# Patient Record
Sex: FEMALE | Race: BLACK OR AFRICAN AMERICAN | NOT HISPANIC OR LATINO | Employment: FULL TIME | RURAL
[De-identification: names, ages, dates, MRNs, and addresses within clinical notes are randomized per-mention and may not be internally consistent; named-entity substitution may affect disease eponyms.]

---

## 2015-10-21 LAB — PAP RECOMMENDATION EXT: NORMAL

## 2018-11-09 LAB — BCS RECOMMENDATION EXT: NORMAL

## 2021-11-24 ENCOUNTER — OFFICE VISIT (OUTPATIENT)
Dept: FAMILY MEDICINE | Facility: CLINIC | Age: 51
End: 2021-11-24
Payer: COMMERCIAL

## 2021-11-24 VITALS
HEART RATE: 69 BPM | SYSTOLIC BLOOD PRESSURE: 143 MMHG | WEIGHT: 265 LBS | HEIGHT: 71 IN | OXYGEN SATURATION: 98 % | RESPIRATION RATE: 18 BRPM | TEMPERATURE: 99 F | BODY MASS INDEX: 37.1 KG/M2 | DIASTOLIC BLOOD PRESSURE: 91 MMHG

## 2021-11-24 DIAGNOSIS — F41.9 ANXIETY: Chronic | ICD-10-CM

## 2021-11-24 DIAGNOSIS — R42 DIZZINESS: ICD-10-CM

## 2021-11-24 DIAGNOSIS — F32.0 CURRENT MILD EPISODE OF MAJOR DEPRESSIVE DISORDER, UNSPECIFIED WHETHER RECURRENT: Primary | Chronic | ICD-10-CM

## 2021-11-24 LAB
ANION GAP SERPL CALCULATED.3IONS-SCNC: 11 MMOL/L (ref 7–16)
BASOPHILS # BLD AUTO: 0.04 K/UL (ref 0–0.2)
BASOPHILS NFR BLD AUTO: 0.8 % (ref 0–1)
BUN SERPL-MCNC: 9 MG/DL (ref 7–18)
BUN/CREAT SERPL: 15 (ref 6–20)
CALCIUM SERPL-MCNC: 8.7 MG/DL (ref 8.5–10.1)
CHLORIDE SERPL-SCNC: 107 MMOL/L (ref 98–107)
CO2 SERPL-SCNC: 25 MMOL/L (ref 21–32)
CREAT SERPL-MCNC: 0.61 MG/DL (ref 0.55–1.02)
DIFFERENTIAL METHOD BLD: ABNORMAL
EOSINOPHIL # BLD AUTO: 0.18 K/UL (ref 0–0.5)
EOSINOPHIL NFR BLD AUTO: 3.8 % (ref 1–4)
ERYTHROCYTE [DISTWIDTH] IN BLOOD BY AUTOMATED COUNT: 12.3 % (ref 11.5–14.5)
GLUCOSE SERPL-MCNC: 79 MG/DL (ref 74–106)
HCT VFR BLD AUTO: 42.8 % (ref 38–47)
HGB BLD-MCNC: 14.2 G/DL (ref 12–16)
IMM GRANULOCYTES # BLD AUTO: 0 K/UL (ref 0–0.04)
IMM GRANULOCYTES NFR BLD: 0 % (ref 0–0.4)
LYMPHOCYTES # BLD AUTO: 1.61 K/UL (ref 1–4.8)
LYMPHOCYTES NFR BLD AUTO: 34 % (ref 27–41)
MCH RBC QN AUTO: 30.3 PG (ref 27–31)
MCHC RBC AUTO-ENTMCNC: 33.2 G/DL (ref 32–36)
MCV RBC AUTO: 91.5 FL (ref 80–96)
MONOCYTES # BLD AUTO: 0.6 K/UL (ref 0–0.8)
MONOCYTES NFR BLD AUTO: 12.7 % (ref 2–6)
MPC BLD CALC-MCNC: 10.5 FL (ref 9.4–12.4)
NEUTROPHILS # BLD AUTO: 2.31 K/UL (ref 1.8–7.7)
NEUTROPHILS NFR BLD AUTO: 48.7 % (ref 53–65)
NRBC # BLD AUTO: 0 X10E3/UL
NRBC, AUTO (.00): 0 %
PLATELET # BLD AUTO: 272 K/UL (ref 150–400)
POTASSIUM SERPL-SCNC: 3.8 MMOL/L (ref 3.5–5.1)
RBC # BLD AUTO: 4.68 M/UL (ref 4.2–5.4)
SODIUM SERPL-SCNC: 139 MMOL/L (ref 136–145)
TSH SERPL DL<=0.005 MIU/L-ACNC: 3.15 UIU/ML (ref 0.36–3.74)
WBC # BLD AUTO: 4.74 K/UL (ref 4.5–11)

## 2021-11-24 PROCEDURE — 85025 COMPLETE CBC W/AUTO DIFF WBC: CPT | Mod: ,,, | Performed by: CLINICAL MEDICAL LABORATORY

## 2021-11-24 PROCEDURE — 99214 OFFICE O/P EST MOD 30 MIN: CPT | Mod: ,,, | Performed by: NURSE PRACTITIONER

## 2021-11-24 PROCEDURE — 80048 BASIC METABOLIC PNL TOTAL CA: CPT | Mod: ,,, | Performed by: CLINICAL MEDICAL LABORATORY

## 2021-11-24 PROCEDURE — 84443 ASSAY THYROID STIM HORMONE: CPT | Mod: ,,, | Performed by: CLINICAL MEDICAL LABORATORY

## 2021-11-24 PROCEDURE — 85025 CBC WITH DIFFERENTIAL: ICD-10-PCS | Mod: ,,, | Performed by: CLINICAL MEDICAL LABORATORY

## 2021-11-24 PROCEDURE — 84443 TSH: ICD-10-PCS | Mod: ,,, | Performed by: CLINICAL MEDICAL LABORATORY

## 2021-11-24 PROCEDURE — 99214 PR OFFICE/OUTPT VISIT, EST, LEVL IV, 30-39 MIN: ICD-10-PCS | Mod: ,,, | Performed by: NURSE PRACTITIONER

## 2021-11-24 PROCEDURE — 80048 BASIC METABOLIC PANEL: ICD-10-PCS | Mod: ,,, | Performed by: CLINICAL MEDICAL LABORATORY

## 2021-11-24 RX ORDER — FLUOXETINE HYDROCHLORIDE 20 MG/1
20 CAPSULE ORAL DAILY
Qty: 30 CAPSULE | Refills: 1 | Status: SHIPPED | OUTPATIENT
Start: 2021-11-24 | End: 2023-11-27

## 2021-11-29 ENCOUNTER — TELEPHONE (OUTPATIENT)
Dept: FAMILY MEDICINE | Facility: CLINIC | Age: 51
End: 2021-11-29
Payer: COMMERCIAL

## 2022-12-28 ENCOUNTER — OFFICE VISIT (OUTPATIENT)
Dept: FAMILY MEDICINE | Facility: CLINIC | Age: 52
End: 2022-12-28
Payer: COMMERCIAL

## 2022-12-28 VITALS
SYSTOLIC BLOOD PRESSURE: 131 MMHG | RESPIRATION RATE: 20 BRPM | HEIGHT: 71 IN | TEMPERATURE: 98 F | WEIGHT: 271.63 LBS | DIASTOLIC BLOOD PRESSURE: 94 MMHG | OXYGEN SATURATION: 97 % | BODY MASS INDEX: 38.03 KG/M2 | HEART RATE: 72 BPM

## 2022-12-28 DIAGNOSIS — M25.562 ARTHRALGIA OF BOTH KNEES: Primary | ICD-10-CM

## 2022-12-28 DIAGNOSIS — M25.561 ARTHRALGIA OF BOTH KNEES: Primary | ICD-10-CM

## 2022-12-28 PROCEDURE — 3075F SYST BP GE 130 - 139MM HG: CPT | Mod: CPTII,,, | Performed by: NURSE PRACTITIONER

## 2022-12-28 PROCEDURE — 99213 PR OFFICE/OUTPT VISIT, EST, LEVL III, 20-29 MIN: ICD-10-PCS | Mod: ,,, | Performed by: NURSE PRACTITIONER

## 2022-12-28 PROCEDURE — 1159F MED LIST DOCD IN RCRD: CPT | Mod: CPTII,,, | Performed by: NURSE PRACTITIONER

## 2022-12-28 PROCEDURE — 1159F PR MEDICATION LIST DOCUMENTED IN MEDICAL RECORD: ICD-10-PCS | Mod: CPTII,,, | Performed by: NURSE PRACTITIONER

## 2022-12-28 PROCEDURE — 3008F BODY MASS INDEX DOCD: CPT | Mod: CPTII,,, | Performed by: NURSE PRACTITIONER

## 2022-12-28 PROCEDURE — 99213 OFFICE O/P EST LOW 20 MIN: CPT | Mod: ,,, | Performed by: NURSE PRACTITIONER

## 2022-12-28 PROCEDURE — 3075F PR MOST RECENT SYSTOLIC BLOOD PRESS GE 130-139MM HG: ICD-10-PCS | Mod: CPTII,,, | Performed by: NURSE PRACTITIONER

## 2022-12-28 PROCEDURE — 3080F DIAST BP >= 90 MM HG: CPT | Mod: CPTII,,, | Performed by: NURSE PRACTITIONER

## 2022-12-28 PROCEDURE — 3080F PR MOST RECENT DIASTOLIC BLOOD PRESSURE >= 90 MM HG: ICD-10-PCS | Mod: CPTII,,, | Performed by: NURSE PRACTITIONER

## 2022-12-28 PROCEDURE — 3008F PR BODY MASS INDEX (BMI) DOCUMENTED: ICD-10-PCS | Mod: CPTII,,, | Performed by: NURSE PRACTITIONER

## 2022-12-28 RX ORDER — NAPROXEN 500 MG/1
500 TABLET ORAL 2 TIMES DAILY WITH MEALS
Qty: 60 TABLET | Refills: 1 | Status: SHIPPED | OUTPATIENT
Start: 2022-12-28 | End: 2023-11-27

## 2022-12-28 NOTE — PROGRESS NOTES
JAYCEE Andrew   [facility]  [address]   [place] [address]     PATIENT NAME: Beverley Rdz  : 1970  DATE: 22  MRN: [id number]      Billing Provider: JAYCEE Andrew  Level of Service:   Patient PCP Information       Provider PCP Type    JAYCEE Andrew General            Reason for Visit / Chief Complaint: Arthritis (Left arm/hand, right elbow, back, bilat knees. ) and Eye Drainage (Bilat eyes irritated and watering x about 4 days.)       Update PCP  Update Chief Complaint         History of Present Illness / Problem Focused Workflow     Beverley Rdz is a 52 y.o. female presents to the clinic  with complaint of mutliple joint pain (left shoulder, bilateral  elbows, hands, bilateral knees ) and has  been taking aleve one time daily  with parrtial relief and has used biofreeze with minimal relief.  She has been on vacation  x 2 weeks and states she has not been doing anything at home to injure herself.    Eyes feel irritated and  itchy and  very dry.  She has tried  some  Visine.  She sleeps  with a fan on in the room.  NO allergy symptoms.       Review of Systems     Review of Systems   Constitutional:  Negative for fatigue.   HENT:  Negative for nasal congestion and sore throat.    Respiratory:  Negative for cough, chest tightness and shortness of breath.    Cardiovascular:  Negative for chest pain, palpitations and leg swelling.   Gastrointestinal:  Negative for nausea, vomiting and reflux.   Musculoskeletal:  Positive for arthralgias.   Neurological:  Negative for weakness and memory loss.   Psychiatric/Behavioral:  Negative for confusion and sleep disturbance.       Medical / Social / Family History     Past Medical History:   Diagnosis Date    Anxiety     Depression        Past Surgical History:   Procedure Laterality Date     SECTION      HERNIA REPAIR      PARTIAL HYSTERECTOMY         Social History  Ms.  reports that she has never smoked. She has never used  "smokeless tobacco. She reports that she does not drink alcohol and does not use drugs.    Family History  Ms.'s family history includes Hypertension in her mother.    Medications and Allergies     Medications  No outpatient medications have been marked as taking for the 12/28/22 encounter (Office Visit) with JAYCEE Andrew.       Allergies  Review of patient's allergies indicates:  No Known Allergies    Physical Examination     Vitals:    12/28/22 1054   BP: (!) 131/94   Pulse: 72   Resp: 20   Temp: 98.4 °F (36.9 °C)   TempSrc: Oral   SpO2: 97%   Weight: 123.2 kg (271 lb 9.6 oz)   Height: 5' 11" (1.803 m)      Physical Exam  Constitutional:       Appearance: Normal appearance.   Cardiovascular:      Rate and Rhythm: Normal rate and regular rhythm.      Pulses: Normal pulses.      Heart sounds: Normal heart sounds.   Pulmonary:      Effort: Pulmonary effort is normal.      Breath sounds: Normal breath sounds.   Musculoskeletal:      Comments: Shoulders and scapula are not tender to palp.  She is tender to palp right lateral/medial epicondyles and is tender to palp latearal aspects of both knees with crepitus noted.   Skin:     General: Skin is warm and dry.   Neurological:      Mental Status: She is alert.        Assessment and Plan (including Health Maintenance)      Problem List  Smart Sets  Document Outside HM   :    Plan:  recommended she use  ice packs to elbow joints that are tender, use of Osteobioflex on a regular basis for joint protection, stretching exercises every day.  Arthritis Tylenol can be used per package directions for pain relief.   Will Rx Naproxen 500mg to take one tab bid prn for joint pain--cautioned that may cause kidney damage, GI  upset--take with food.  Recommended Systane eye drops for dry eye, avoid sleeping with fan on.         Health Maintenance Due   Topic Date Due    Hepatitis C Screening  Never done    Cervical Cancer Screening  Never done    Lipid Panel  Never done    HIV " Screening  Never done    TETANUS VACCINE  Never done    Mammogram  Never done    Colorectal Cancer Screening  Never done    Shingles Vaccine (1 of 2) Never done    COVID-19 Vaccine (3 - Booster for Moderna series) 06/13/2021    Influenza Vaccine (1) Never done       Problem List Items Addressed This Visit    None    There are no diagnoses linked to this encounter.   The patient has no Health Maintenance topics of status Not Due    Procedures     No future appointments.     No follow-ups on file.     Signature:  JAYCEE Andrew    Date of encounter: 12/28/22

## 2023-02-06 ENCOUNTER — PATIENT OUTREACH (OUTPATIENT)
Dept: ADMINISTRATIVE | Facility: HOSPITAL | Age: 53
End: 2023-02-06

## 2023-11-27 ENCOUNTER — OFFICE VISIT (OUTPATIENT)
Dept: FAMILY MEDICINE | Facility: CLINIC | Age: 53
End: 2023-11-27
Payer: COMMERCIAL

## 2023-11-27 VITALS
BODY MASS INDEX: 36.63 KG/M2 | WEIGHT: 261.63 LBS | RESPIRATION RATE: 20 BRPM | HEIGHT: 71 IN | TEMPERATURE: 98 F | SYSTOLIC BLOOD PRESSURE: 118 MMHG | DIASTOLIC BLOOD PRESSURE: 83 MMHG | HEART RATE: 79 BPM | OXYGEN SATURATION: 98 %

## 2023-11-27 DIAGNOSIS — J10.1 INFLUENZA A: Primary | ICD-10-CM

## 2023-11-27 DIAGNOSIS — R05.9 COUGH, UNSPECIFIED TYPE: ICD-10-CM

## 2023-11-27 LAB
CTP QC/QA: YES
CTP QC/QA: YES
FLUAV AG NPH QL: POSITIVE
FLUBV AG NPH QL: NEGATIVE
SARS-COV-2 RDRP RESP QL NAA+PROBE: NEGATIVE

## 2023-11-27 PROCEDURE — 3008F PR BODY MASS INDEX (BMI) DOCUMENTED: ICD-10-PCS | Mod: CPTII,,, | Performed by: NURSE PRACTITIONER

## 2023-11-27 PROCEDURE — 99213 OFFICE O/P EST LOW 20 MIN: CPT | Mod: ,,, | Performed by: NURSE PRACTITIONER

## 2023-11-27 PROCEDURE — 87804 INFLUENZA ASSAY W/OPTIC: CPT | Mod: 59,QW,, | Performed by: NURSE PRACTITIONER

## 2023-11-27 PROCEDURE — 99213 PR OFFICE/OUTPT VISIT, EST, LEVL III, 20-29 MIN: ICD-10-PCS | Mod: ,,, | Performed by: NURSE PRACTITIONER

## 2023-11-27 PROCEDURE — 3079F PR MOST RECENT DIASTOLIC BLOOD PRESSURE 80-89 MM HG: ICD-10-PCS | Mod: CPTII,,, | Performed by: NURSE PRACTITIONER

## 2023-11-27 PROCEDURE — 87804 POCT INFLUENZA A/B: ICD-10-PCS | Mod: 59,QW,, | Performed by: NURSE PRACTITIONER

## 2023-11-27 PROCEDURE — 3008F BODY MASS INDEX DOCD: CPT | Mod: CPTII,,, | Performed by: NURSE PRACTITIONER

## 2023-11-27 PROCEDURE — 3074F SYST BP LT 130 MM HG: CPT | Mod: CPTII,,, | Performed by: NURSE PRACTITIONER

## 2023-11-27 PROCEDURE — 87635 SARS-COV-2 COVID-19 AMP PRB: CPT | Mod: QW,,, | Performed by: NURSE PRACTITIONER

## 2023-11-27 PROCEDURE — 87635: ICD-10-PCS | Mod: QW,,, | Performed by: NURSE PRACTITIONER

## 2023-11-27 PROCEDURE — 3079F DIAST BP 80-89 MM HG: CPT | Mod: CPTII,,, | Performed by: NURSE PRACTITIONER

## 2023-11-27 PROCEDURE — 3074F PR MOST RECENT SYSTOLIC BLOOD PRESSURE < 130 MM HG: ICD-10-PCS | Mod: CPTII,,, | Performed by: NURSE PRACTITIONER

## 2023-11-27 PROCEDURE — 1159F PR MEDICATION LIST DOCUMENTED IN MEDICAL RECORD: ICD-10-PCS | Mod: CPTII,,, | Performed by: NURSE PRACTITIONER

## 2023-11-27 PROCEDURE — 1159F MED LIST DOCD IN RCRD: CPT | Mod: CPTII,,, | Performed by: NURSE PRACTITIONER

## 2023-11-27 NOTE — LETTER
November 27, 2023      [facility] [address] [place] [address]  Phone: [phone]  Fax: [phone]       Patient: Beverley Rdz   YOB: 1970  Date of Visit: 11/27/2023    To Whom It May Concern:    JENIFER Rdz  was at  on 11/27/2023 with influenza.   The patient may return to work/school on 11/28/23  if no fever restrictions. If you have any questions or concerns, or if I can be of further assistance, please do not hesitate to contact me.    Sincerely,    JAYCEE Andrew

## 2023-11-27 NOTE — PROGRESS NOTES
JAYCEE Andrew   [facility]  [address]   [place] [address]     PATIENT NAME: Beverley Rdz  : 1970  DATE: 23  MRN: [id number]      Billing Provider: JAYCEE Andrew  Level of Service:   Patient PCP Information       Provider PCP Type    JAYCEE Andrew General            Reason for Visit / Chief Complaint: Cough (X 4 days (23).), Nasal Congestion, Sinus Problem (Pressure behind eyes), Fatigue, Anorexia (Loss of appetite. ), Fever (Thinks she has had fever off and on since Thursday (23).), and Generalized Body Aches         History of Present Illness / Problem Focused Workflow     Beverley Rdz is a 53 y.o. female presents to the clinic  with approx 4 day history of sinus pressure, nasal congestion, cough, fatigue and fever and body aches. She has been  taking OTC meds and has improved.      Review of Systems     Review of Systems   Constitutional:  Positive for chills, fatigue and fever.   HENT:  Positive for nasal congestion, sinus pressure/congestion and sore throat.    Respiratory:  Positive for cough. Negative for choking, chest tightness and shortness of breath.    Cardiovascular:  Negative for chest pain, palpitations and leg swelling.   Gastrointestinal:  Negative for nausea, vomiting and reflux.   Neurological:  Negative for weakness and memory loss.   Psychiatric/Behavioral:  Negative for confusion and sleep disturbance.         Medical / Social / Family History     Past Medical History:   Diagnosis Date    Anxiety     Depression        Past Surgical History:   Procedure Laterality Date     SECTION      HERNIA REPAIR      PARTIAL HYSTERECTOMY         Social History  Ms.  reports that she has never smoked. She has been exposed to tobacco smoke. She has never used smokeless tobacco. She reports that she does not drink alcohol and does not use drugs.    Family History  Ms.'s family history includes Hypertension in her mother.    Medications and  "Allergies     Medications  No outpatient medications have been marked as taking for the 11/27/23 encounter (Office Visit) with Yandy Amaro FNP.       Allergies  Review of patient's allergies indicates:  No Known Allergies    Physical Examination     Vitals:    11/27/23 1026   BP: 118/83   Pulse: 79   Resp: 20   Temp: 98.3 °F (36.8 °C)   TempSrc: Oral   SpO2: 98%   Weight: 118.7 kg (261 lb 9.6 oz)   Height: 5' 11" (1.803 m)      Physical Exam  Constitutional:       Appearance: Normal appearance.   HENT:      Right Ear: Tympanic membrane, ear canal and external ear normal.      Left Ear: Tympanic membrane, ear canal and external ear normal.      Nose: Rhinorrhea present.      Mouth/Throat:      Pharynx: No oropharyngeal exudate or posterior oropharyngeal erythema.   Cardiovascular:      Rate and Rhythm: Normal rate and regular rhythm.      Pulses: Normal pulses.      Heart sounds: Normal heart sounds.   Pulmonary:      Effort: Pulmonary effort is normal.      Breath sounds: Normal breath sounds.   Lymphadenopathy:      Cervical: No cervical adenopathy.   Skin:     General: Skin is warm and dry.   Neurological:      Mental Status: She is alert.   Psychiatric:         Behavior: Behavior normal.          Assessment and Plan (including Health Maintenance)     :    Plan:  pt advised she has  Type A flu and should continue with OTC cough and congestion meds  and drink lots of fluids.  Follow up if needed. Work excuse provided to return tomorrow if fever free. /tm        Health Maintenance Due   Topic Date Due    Hepatitis C Screening  Never done    Lipid Panel  Never done    HIV Screening  Never done    TETANUS VACCINE  Never done    Hemoglobin A1c (Diabetic Prevention Screening)  Never done    Colorectal Cancer Screening  Never done    Mammogram  11/09/2019    Shingles Vaccine (1 of 2) Never done    Influenza Vaccine (1) Never done    COVID-19 Vaccine (3 - 2023-24 season) 09/01/2023       Problem List Items Addressed This " Visit          ID    Influenza A - Primary     Other Visit Diagnoses       Cough, unspecified type            .  Influenza A    Cough, unspecified type  -     POCT Influenza A/B  -     POCT COVID-19 Rapid Screening       The patient has no Health Maintenance topics of status Not Due    Procedures     No future appointments.     No follow-ups on file.     Signature:  JAYCEE Andrew    Date of encounter: 11/27/23

## 2023-12-04 ENCOUNTER — TELEPHONE (OUTPATIENT)
Dept: FAMILY MEDICINE | Facility: CLINIC | Age: 53
End: 2023-12-04
Payer: COMMERCIAL

## 2023-12-04 DIAGNOSIS — Z12.31 ENCOUNTER FOR SCREENING MAMMOGRAM FOR MALIGNANT NEOPLASM OF BREAST: Primary | ICD-10-CM

## 2023-12-04 NOTE — TELEPHONE ENCOUNTER
Spoke with pt. She would like her mammogram scheduled at [facility]. She will be on vacation 12/18 - 1/2 and would like it scheduled sometime during that time.

## 2024-02-27 ENCOUNTER — TELEPHONE (OUTPATIENT)
Dept: FAMILY MEDICINE | Facility: CLINIC | Age: 54
End: 2024-02-27
Payer: COMMERCIAL

## 2024-02-27 RX ORDER — DICLOFENAC SODIUM 75 MG/1
75 TABLET, DELAYED RELEASE ORAL 2 TIMES DAILY
COMMUNITY
Start: 2023-12-18

## 2024-02-27 NOTE — TELEPHONE ENCOUNTER
Randi VASQUEZ let us know that this pt's mammogram was abnormal and they have already contacted the patient and scheduled her for more imaging.

## 2024-03-07 ENCOUNTER — TELEPHONE (OUTPATIENT)
Dept: FAMILY MEDICINE | Facility: CLINIC | Age: 54
End: 2024-03-07
Payer: COMMERCIAL

## 2024-03-07 ENCOUNTER — PATIENT OUTREACH (OUTPATIENT)
Dept: ADMINISTRATIVE | Facility: HOSPITAL | Age: 54
End: 2024-03-07

## 2024-03-07 NOTE — TELEPHONE ENCOUNTER
Contacted pt with recent mammogram and breast ultrasound results from [facility].discussed with pt the need for 6 month ultrasounds and pt vo understanding. Pt has an appt with JAYCEE Andrew, 03/11/2024 and can schedule her 6 month breast sono.

## 2024-03-07 NOTE — PROGRESS NOTES
Uploaded mammogram from [facility] [place]. It was incomplete, and additional imaging evaluation is needed.

## 2024-03-08 ENCOUNTER — PATIENT OUTREACH (OUTPATIENT)
Dept: ADMINISTRATIVE | Facility: HOSPITAL | Age: 54
End: 2024-03-08

## 2024-03-08 NOTE — PROGRESS NOTES
03/08/2024   Immunization Database ([employer]/MI[employer]) reviewed. Vaccinations uploaded:n/a  Next appointment 3/11/2024 . Appointment notes updated to include: due for Hep C, lipid panel, HIV, and colonoscopy  Health Maintenance Due   Topic Date Due    Hepatitis C Screening  Never done    Lipid Panel  Never done    HIV Screening  Never done    Hemoglobin A1c (Diabetic Prevention Screening)  Never done    TETANUS VACCINE  04/30/2007    Colorectal Cancer Screening  Never done    Shingles Vaccine (1 of 2) Never done    Influenza Vaccine (1) Never done    COVID-19 Vaccine (3 - 2023-24 season) 09/01/2023

## 2024-03-11 ENCOUNTER — OFFICE VISIT (OUTPATIENT)
Dept: FAMILY MEDICINE | Facility: CLINIC | Age: 54
End: 2024-03-11
Payer: COMMERCIAL

## 2024-03-11 VITALS
WEIGHT: 267.63 LBS | TEMPERATURE: 98 F | RESPIRATION RATE: 20 BRPM | OXYGEN SATURATION: 99 % | SYSTOLIC BLOOD PRESSURE: 132 MMHG | DIASTOLIC BLOOD PRESSURE: 88 MMHG | BODY MASS INDEX: 37.47 KG/M2 | HEART RATE: 71 BPM | HEIGHT: 71 IN

## 2024-03-11 DIAGNOSIS — Z12.11 SCREENING FOR COLON CANCER: Primary | ICD-10-CM

## 2024-03-11 DIAGNOSIS — Z13.31 POSITIVE DEPRESSION SCREENING: ICD-10-CM

## 2024-03-11 PROBLEM — J10.1 INFLUENZA A: Status: RESOLVED | Noted: 2023-11-27 | Resolved: 2024-03-11

## 2024-03-11 PROCEDURE — 1159F MED LIST DOCD IN RCRD: CPT | Mod: CPTII,,, | Performed by: NURSE PRACTITIONER

## 2024-03-11 PROCEDURE — 99213 OFFICE O/P EST LOW 20 MIN: CPT | Mod: ,,, | Performed by: NURSE PRACTITIONER

## 2024-03-11 PROCEDURE — 3079F DIAST BP 80-89 MM HG: CPT | Mod: CPTII,,, | Performed by: NURSE PRACTITIONER

## 2024-03-11 PROCEDURE — 3008F BODY MASS INDEX DOCD: CPT | Mod: CPTII,,, | Performed by: NURSE PRACTITIONER

## 2024-03-11 PROCEDURE — 3075F SYST BP GE 130 - 139MM HG: CPT | Mod: CPTII,,, | Performed by: NURSE PRACTITIONER

## 2024-03-11 RX ORDER — ERGOCALCIFEROL 1.25 MG/1
50000 CAPSULE ORAL
COMMUNITY
Start: 2024-03-01

## 2024-03-11 NOTE — PROGRESS NOTES
JAYCEE Andrew   [facility]  [address]   [place] [address]     PATIENT NAME: Beverley Rdz  : 1970  DATE: 3/11/24  MRN: [id number]      Billing Provider: JAYCEE Andrew  Level of Service: PA OFFICE/OUTPT VISIT, EST, LEVL III, 20-29 MIN  Patient PCP Information       Provider PCP Type    JAYCEE Andrew General            Reason for Visit / Chief Complaint: Foot Pain (States she fell around [place] and hurt her left foot. She has been having numbness and tingling in her feet before she fell, but thinks it is worse since her fall. ), Dizziness (X about 2 weeks. States her BP has been elevated at times. ), and Sinus Problem         History of Present Illness / Problem Focused Workflow     Beverley Rdz is a 53 y.o. female presents to the clinic pt fell  down icy steps around [place] and  hurt  her left foot and  and saw Stefanie KEY at [place]  and was given Dicleofenac 75mg bid along with Vitamin D and her foot has not gotten any better other than swelling decreasing. She has had tingling in  the toes of both feet for a good while and was present  before she fell.  She  complains of feeling fatigued for a while and has had some  dizziness  and loss of balance. She  notes her left leg/foot is weaker than her right leg.   She wears steel toed shoes at work.   She is having some sinus congestion and drainage and has allergies this time of year--she is not currently taking any medications for her allergies.       Review of Systems     Review of Systems   Constitutional:  Negative for fatigue.   HENT:  Positive for nasal congestion and sinus pressure/congestion. Negative for sore throat.    Respiratory:  Negative for cough, chest tightness and shortness of breath.    Cardiovascular:  Negative for chest pain, palpitations and leg swelling.   Gastrointestinal:  Negative for nausea, vomiting and reflux.   Integumentary:  Positive for breast tenderness.        3 simple cysts right breast  "  Neurological:  Negative for weakness and memory loss.        Parasthesia of toes.   Psychiatric/Behavioral:  Negative for confusion and sleep disturbance.    Breast: Positive for tenderness.       Medical / Social / Family History     Past Medical History:   Diagnosis Date    Anxiety     Depression        Past Surgical History:   Procedure Laterality Date     SECTION      HERNIA REPAIR      PARTIAL HYSTERECTOMY         Social History  Ms.  reports that she has never smoked. She has been exposed to tobacco smoke. She has never used smokeless tobacco. She reports that she does not drink alcohol and does not use drugs.    Family History  Ms.'s family history includes Hypertension in her mother.    Medications and Allergies     Medications  Outpatient Medications Marked as Taking for the 3/11/24 encounter (Office Visit) with Yandy Amaro FNP   Medication Sig Dispense Refill    ergocalciferol (ERGOCALCIFEROL) 50,000 unit Cap Take 50,000 Units by mouth every 7 days.         Allergies  Review of patient's allergies indicates:  No Known Allergies    Physical Examination     Vitals:    24 1501   BP: 132/88   Pulse: 71   Resp: 20   Temp: 98.3 °F (36.8 °C)   TempSrc: Oral   SpO2: 99%   Weight: 121.4 kg (267 lb 9.6 oz)   Height: 5' 11" (1.803 m)      Physical Exam  Constitutional:       Appearance: Normal appearance.   Cardiovascular:      Rate and Rhythm: Normal rate and regular rhythm.      Pulses: Normal pulses.           Dorsalis pedis pulses are 2+ on the right side and 2+ on the left side.      Heart sounds: Normal heart sounds.   Pulmonary:      Effort: Pulmonary effort is normal.      Breath sounds: Normal breath sounds.   Musculoskeletal:      Left foot: Bunion (small bunion at great toe) present.   Feet:      Right foot:      Protective Sensation: 10 sites tested.  10 sites sensed.      Skin integrity: Skin integrity normal.      Toenail Condition: Right toenails are normal.      Left foot:      " Protective Sensation: 10 sites tested.  10 sites sensed.      Skin integrity: Skin integrity normal.      Toenail Condition: Left toenails are normal.   Skin:     General: Skin is warm and dry.   Neurological:      Mental Status: She is alert.          Assessment and Plan (including Health Maintenance)     :    Plan:  will obtain labs from  [facility] this week and call pt for additional labs as needed.  Encouraged to wear  well padded shoes.  Pt  has never  had colon cancer and will  schedule at [facility] at pt request.  Discussed depression screening with patient and she feels she does not need  any medications at this time--just needs more energy.  Will need to get xray of left foot if pain persists and labs tests are negative. I do not have xray tech in clinic today/tm        Health Maintenance Due   Topic Date Due    Hepatitis C Screening  Never done    Lipid Panel  Never done    HIV Screening  Never done    Hemoglobin A1c (Diabetic Prevention Screening)  Never done    TETANUS VACCINE  04/30/2007    Colorectal Cancer Screening  Never done    Shingles Vaccine (1 of 2) Never done    COVID-19 Vaccine (3 - 2023-24 season) 09/01/2023       Problem List Items Addressed This Visit    None  Visit Diagnoses       Screening for colon cancer    -  Primary    Positive depression screening        I have reviewed the positive depression score which warrants active treatment with psychotherapy and/or medications.          .  Screening for colon cancer  -     Ambulatory referral/consult to Gastroenterology; Future; Expected date: 03/18/2024    Positive depression screening  Comments:  I have reviewed the positive depression score which warrants active treatment with psychotherapy and/or medications.       Health Maintenance Topics with due status: Not Due       Topic Last Completion Date    Mammogram 03/07/2024       Procedures     No future appointments.     No follow-ups on file.     Signature:  Yandy Amaro  FNP    Date of encounter: 3/11/24    I have reviewed the positive depression score which warrants active treatment with psychotherapy and/or medications.  Pt chose not to take medications at this time./ayo fnp

## 2024-03-11 NOTE — PATIENT INSTRUCTIONS
will obtain labs from  [facility] this week and call pt for additional labs as needed.  Encouraged to wear  well padded shoes.  Pt  has never  had colon cancer and will  schedule at [facility] at pt request.  Discussed depression screening with patient and she feels she does not need  any medications at this time--just needs more energy.  Will need to get xray of left foot if pain persists and labs tests are negative. I do not have xray tech in clinic today/tm

## 2024-05-16 LAB — CRC RECOMMENDATION EXT: NORMAL

## 2024-06-20 ENCOUNTER — PATIENT OUTREACH (OUTPATIENT)
Facility: HOSPITAL | Age: 54
End: 2024-06-20
Payer: COMMERCIAL

## 2024-06-24 ENCOUNTER — OFFICE VISIT (OUTPATIENT)
Dept: FAMILY MEDICINE | Facility: CLINIC | Age: 54
End: 2024-06-24
Payer: COMMERCIAL

## 2024-06-24 VITALS
HEART RATE: 70 BPM | HEIGHT: 71 IN | TEMPERATURE: 98 F | RESPIRATION RATE: 18 BRPM | BODY MASS INDEX: 37.91 KG/M2 | WEIGHT: 270.81 LBS | SYSTOLIC BLOOD PRESSURE: 129 MMHG | OXYGEN SATURATION: 97 % | DIASTOLIC BLOOD PRESSURE: 83 MMHG

## 2024-06-24 DIAGNOSIS — E55.9 VITAMIN D DEFICIENCY: ICD-10-CM

## 2024-06-24 DIAGNOSIS — M54.40 ACUTE MIDLINE LOW BACK PAIN WITH SCIATICA, SCIATICA LATERALITY UNSPECIFIED: Primary | ICD-10-CM

## 2024-06-24 DIAGNOSIS — E53.9 VITAMIN B DEFICIENCY: ICD-10-CM

## 2024-06-24 LAB
25(OH)D3 SERPL-MCNC: 24.4 NG/ML
FOLATE SERPL-MCNC: 5.7 NG/ML (ref 3.1–17.5)
VIT B12 SERPL-MCNC: 446 PG/ML (ref 193–986)

## 2024-06-24 PROCEDURE — 99213 OFFICE O/P EST LOW 20 MIN: CPT | Mod: ,,, | Performed by: NURSE PRACTITIONER

## 2024-06-24 PROCEDURE — 82746 ASSAY OF FOLIC ACID SERUM: CPT | Mod: ,,, | Performed by: CLINICAL MEDICAL LABORATORY

## 2024-06-24 PROCEDURE — 3074F SYST BP LT 130 MM HG: CPT | Mod: CPTII,,, | Performed by: NURSE PRACTITIONER

## 2024-06-24 PROCEDURE — 1159F MED LIST DOCD IN RCRD: CPT | Mod: CPTII,,, | Performed by: NURSE PRACTITIONER

## 2024-06-24 PROCEDURE — 82607 VITAMIN B-12: CPT | Mod: ,,, | Performed by: CLINICAL MEDICAL LABORATORY

## 2024-06-24 PROCEDURE — 82306 VITAMIN D 25 HYDROXY: CPT | Mod: ,,, | Performed by: CLINICAL MEDICAL LABORATORY

## 2024-06-24 PROCEDURE — 3079F DIAST BP 80-89 MM HG: CPT | Mod: CPTII,,, | Performed by: NURSE PRACTITIONER

## 2024-06-24 PROCEDURE — 36415 COLL VENOUS BLD VENIPUNCTURE: CPT | Performed by: NURSE PRACTITIONER

## 2024-06-24 PROCEDURE — 3008F BODY MASS INDEX DOCD: CPT | Mod: CPTII,,, | Performed by: NURSE PRACTITIONER

## 2024-06-24 RX ORDER — POLYETHYLENE GLYCOL 3350, SODIUM CHLORIDE, SODIUM BICARBONATE, POTASSIUM CHLORIDE 420; 11.2; 5.72; 1.48 G/4L; G/4L; G/4L; G/4L
POWDER, FOR SOLUTION ORAL
COMMUNITY
Start: 2024-05-07 | End: 2024-06-24

## 2024-06-24 RX ORDER — GABAPENTIN 100 MG/1
100 CAPSULE ORAL 3 TIMES DAILY
COMMUNITY

## 2024-06-24 RX ORDER — LORATADINE 10 MG/1
10 TABLET ORAL DAILY PRN
Qty: 30 TABLET | Refills: 1 | Status: SHIPPED | OUTPATIENT
Start: 2024-06-24 | End: 2025-06-24

## 2024-06-24 RX ORDER — METHOCARBAMOL 500 MG/1
500 TABLET, FILM COATED ORAL 4 TIMES DAILY
Qty: 40 TABLET | Refills: 0 | Status: SHIPPED | OUTPATIENT
Start: 2024-06-24 | End: 2024-07-04

## 2024-06-24 NOTE — LETTER
June 24, 2024      [facility] [address] [place] [address]  Phone: [phone]  Fax: [phone]       Patient: Beverley Rdz   YOB: 1970  Date of Visit: 06/24/2024    To Whom It May Concern:    JENIFER Rdz  was at [facility] on 06/24/2024. The patient may return to work/school on 6/25/24  with no restrictions. If you have any questions or concerns, or if I can be of further assistance, please do not hesitate to contact me.    Sincerely,    JAYCEE Andrew

## 2024-06-24 NOTE — PATIENT INSTRUCTIONS
will check B12 and folate levels today and Vitamin D level as well--both were low previously.    Advised to try taking  her gabapentin 100mg in am and 200mg in pm due to dizziness.    Increase fluid levels especially at work.   Rx Robaxin 500mg tid #21 for muscle spasms.   She may take Aleve 2 tabs in am and 2 tabs in pm for a short time to help with pain and not cause renal damage.  Continue Biofreeze.  Continue  stretching thru the day.  Rx  claritin one daily for sinus congestion/tm

## 2024-06-24 NOTE — PROGRESS NOTES
"   JAYCEE Andrew   [facility]  [address]   [place] [address]     PATIENT NAME: Beverley Rdz  : 1970  DATE: 24  MRN: [id number]      Billing Provider: JAYCEE Andrew  Level of Service:   Patient PCP Information       Provider PCP Type    JAYCEE Andrew General            Reason for Visit / Chief Complaint: Fatigue, Dizziness, Back Pain, Sinus Problem, and Arm Pain (Pt woke this am with dizziness; sinus problems and blowing nose a lot. Friday and Saturday she has been doing a very physical job and she is having back and arm pain. She is saying that this is not workmans comp and she is just needing something to help with the muscle pain./Saturday and  the pain was at it's worse. She was having muscle spasms. Pt is having a lot of fatigue.)         History of Present Illness / Problem Focused Workflow     Beverley Rdz is a 54 y.o. female presents to the clinic  with  fatigue,    sinus congestion and drainage and is dizzy.  She  has chronic lower back pain and has pain radiating into left hip with walking at times. 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   She is having muscle spasms in her lower back and is also catching cramps in her calves as well.  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   She works at [employer] in [place].   Labs reveiewed from March at [facility] with B12 level of 329 and Vitamin D level low--all else was wnl.       Review of Systems     Review of Systems   Constitutional:  Negative for fatigue.   HENT:  Negative for nasal congestion and sore throat.    Respiratory:  Negative for cough, chest tightness and shortness of breath.    Cardiovascular:  Negative for chest pain, palpitations and leg swelling.   Gastrointestinal:  Negative for nausea, vomiting and reflux.   Musculoskeletal:  Positive for back pain.        Arms sore " "  Neurological:  Negative for weakness and memory loss.   Psychiatric/Behavioral:  Negative for confusion and sleep disturbance.         Medical / Social / Family History     Past Medical History:   Diagnosis Date    Anxiety     Depression     Polyp of colon 2024       Past Surgical History:   Procedure Laterality Date     SECTION      HERNIA REPAIR      PARTIAL HYSTERECTOMY         Social History  Ms.  reports that she has never smoked. She has been exposed to tobacco smoke. She has never used smokeless tobacco. She reports that she does not drink alcohol and does not use drugs.    Family History  Ms.'s family history includes Hypertension in her mother.    Medications and Allergies     Medications  Outpatient Medications Marked as Taking for the 24 encounter (Office Visit) with Yandy Amaro FNP   Medication Sig Dispense Refill    gabapentin (NEURONTIN) 100 MG capsule Take 100 mg by mouth 3 (three) times daily.         Allergies  Review of patient's allergies indicates:  No Known Allergies    Physical Examination     Vitals:    24 1015   BP: 129/83   Pulse: 70   Resp: 18   Temp: 98.2 °F (36.8 °C)   TempSrc: Oral   SpO2: 97%   Weight: 122.8 kg (270 lb 12.8 oz)   Height: 5' 11" (1.803 m)      Physical Exam  Constitutional:       Appearance: Normal appearance.   Cardiovascular:      Rate and Rhythm: Normal rate and regular rhythm.   Pulmonary:      Effort: Pulmonary effort is normal.      Breath sounds: Normal breath sounds.   Musculoskeletal:      Comments: Tender to palp lower lumbosacral area and Left SI joint with increased  pain with   rotation to the left and  with flexion and minimally with hyperextension.   Skin:     General: Skin is warm and dry.   Neurological:      Mental Status: She is alert and oriented to person, place, and time.          Assessment and Plan (including Health Maintenance)     :    Plan:  will check B12 and folate levels today and Vitamin D level as well--both " were low previously.    Advised to try taking  her gabapentin 100mg in am and 200mg in pm due to dizziness.    Increase fluid levels especially at work.   Rx Robaxin 500mg tid #21 for muscle spasms.   She may take Aleve 2 tabs in am and 2 tabs in pm for a short time to help with pain and not cause renal damage.  Continue Biofreeze.  Continue  stretching thru the day.  Rx  claritin one daily for sinus congestion/tm        Health Maintenance Due   Topic Date Due    Hepatitis C Screening  Never done    Lipid Panel  Never done    HIV Screening  Never done    Hemoglobin A1c (Diabetic Prevention Screening)  Never done    TETANUS VACCINE  04/30/2007    Shingles Vaccine (1 of 2) Never done    COVID-19 Vaccine (3 - 2023-24 season) 09/01/2023       Problem List Items Addressed This Visit    None  .  There are no diagnoses linked to this encounter.   Health Maintenance Topics with due status: Not Due       Topic Last Completion Date    Mammogram 03/07/2024    Colorectal Cancer Screening 05/16/2024       Procedures     No future appointments.     No follow-ups on file.     Signature:  JAYCEE Andrew    Date of encounter: 6/24/24

## 2024-06-25 ENCOUNTER — TELEPHONE (OUTPATIENT)
Dept: FAMILY MEDICINE | Facility: CLINIC | Age: 54
End: 2024-06-25
Payer: COMMERCIAL

## 2024-06-25 NOTE — PROGRESS NOTES
Notify Ms Lowery that her B12 and folate levels are on the low side of normal.  I want her to  both B12 and Folate vitamins OTC and take one daily and see if that improves her  energy.  Vitamin D level is  low normal and encourage her to take calcium with D vitamin daily/tm

## 2024-06-25 NOTE — TELEPHONE ENCOUNTER
----- Message from Argentina Grant sent at 6/25/2024  8:57 AM CDT -----  Regarding: phone call  Please return pt phone call.

## 2025-04-10 ENCOUNTER — OFFICE VISIT (OUTPATIENT)
Dept: FAMILY MEDICINE | Facility: CLINIC | Age: 55
End: 2025-04-10
Payer: COMMERCIAL

## 2025-04-10 VITALS
SYSTOLIC BLOOD PRESSURE: 127 MMHG | RESPIRATION RATE: 18 BRPM | DIASTOLIC BLOOD PRESSURE: 87 MMHG | HEART RATE: 65 BPM | TEMPERATURE: 98 F | OXYGEN SATURATION: 97 % | BODY MASS INDEX: 38.97 KG/M2 | WEIGHT: 278.38 LBS | HEIGHT: 71 IN

## 2025-04-10 DIAGNOSIS — J01.00 ACUTE MAXILLARY SINUSITIS, RECURRENCE NOT SPECIFIED: ICD-10-CM

## 2025-04-10 DIAGNOSIS — N39.0 URINARY TRACT INFECTION WITH HEMATURIA, SITE UNSPECIFIED: Primary | Chronic | ICD-10-CM

## 2025-04-10 DIAGNOSIS — Z78.0 MENOPAUSE: Chronic | ICD-10-CM

## 2025-04-10 DIAGNOSIS — R31.9 URINARY TRACT INFECTION WITH HEMATURIA, SITE UNSPECIFIED: Primary | Chronic | ICD-10-CM

## 2025-04-10 DIAGNOSIS — R23.2 HOT FLASHES: ICD-10-CM

## 2025-04-10 DIAGNOSIS — M25.50 MULTIPLE JOINT PAIN: ICD-10-CM

## 2025-04-10 DIAGNOSIS — R31.9 HEMATURIA, UNSPECIFIED TYPE: ICD-10-CM

## 2025-04-10 PROCEDURE — 3008F BODY MASS INDEX DOCD: CPT | Mod: CPTII,,, | Performed by: NURSE PRACTITIONER

## 2025-04-10 PROCEDURE — 1159F MED LIST DOCD IN RCRD: CPT | Mod: CPTII,,, | Performed by: NURSE PRACTITIONER

## 2025-04-10 PROCEDURE — 3079F DIAST BP 80-89 MM HG: CPT | Mod: CPTII,,, | Performed by: NURSE PRACTITIONER

## 2025-04-10 PROCEDURE — 99214 OFFICE O/P EST MOD 30 MIN: CPT | Mod: ,,, | Performed by: NURSE PRACTITIONER

## 2025-04-10 PROCEDURE — 3074F SYST BP LT 130 MM HG: CPT | Mod: CPTII,,, | Performed by: NURSE PRACTITIONER

## 2025-04-10 RX ORDER — CEPHALEXIN 500 MG/1
500 CAPSULE ORAL EVERY 6 HOURS
Qty: 21 CAPSULE | Refills: 0 | Status: SHIPPED | OUTPATIENT
Start: 2025-04-10

## 2025-04-10 NOTE — PROGRESS NOTES
JAYCEE Andrew   [facility]  [address]   [place] [address]     PATIENT NAME: Beverley Rdz  : 1970  DATE: 4/10/25  MRN: [id number]      Billing Provider: JAYCEE Andrew  Level of Service: NM OFFICE/OUTPT VISIT, EST, LEVL IV, 30-39 MIN  Patient PCP Information       Provider PCP Type    JAYCEE Andrew General              Reason for Visit / Chief Complaint: Hot Flashes, Night Sweats, Hematuria (Monday, cleared up now), Insomnia, Fatigue, Sinus Problem, and Health Maintenance (Hepatitis C Screening Never done/Lipid Panel Never done/HIV Screening Never done/Hemoglobin A1c (Diabetic Prevention Screening) Never done/TETANUS VACCINE due on 2007/Shingles Vaccine(1 of 2) Never done/Pneumococcal Vaccines (Age 50+)(1 of 1 - PCV) Never done/COVID-19 Vaccine(3 - 2024- season) due on 2024/Mammogram due on 2025 )       History of Present Illness / Problem Focused Workflow     History of Present Illness    CHIEF COMPLAINT:  Patient presents today with blood in nose and sinus pain.    ENT:  She reports frequent nasal discharge requiring frequent nose blowing. She denies experiencing regular sinus problems during this time of year.    MUSCULOSKELETAL:  She reports pain in multiple joints, including elbows, knees, and shoulders. Both knees and left elbow are tender. She takes Aleve, two tablets at a time, for pain management. She has a history of a leg injury from her school years with visible damage, resulting in persistent weakness in the affected leg.    MENOPAUSAL SYMPTOMS:  She has had menopausal symptoms for the past two months, including hot flashes and night sweats, describing waking up drenched during the night. She reports poor sleep quality and fatigue. She has never consulted a gynecologist for management of these symptoms.    SURGICAL HISTORY:  History of partial hysterectomy.    OCULAR:  She experiences intermittent vision changes with enlargement and weakening of  "vision due to bifocal use. She does not wear bifocals while working.    SOCIAL HISTORY:  She works as a , primarily performing mopping tasks. She is right-handed.      ROS:  General: -fever, -chills, +fatigue, -weight gain, -weight loss, +sleep disturbances  Eyes: +vision changes, -redness, -discharge  ENT: -ear pain, +nasal congestion, -sore throat, +nosebleeds  Cardiovascular: -chest pain, -palpitations, -lower extremity edema  Respiratory: -cough, -shortness of breath  Gastrointestinal: -abdominal pain, -nausea, -vomiting, -diarrhea, -constipation, -blood in stool  Genitourinary: -dysuria, +hematuria, -frequency  Musculoskeletal: +joint pain, -muscle pain, +neck pain  Skin: -rash, -lesion  Neurological: +headache, -dizziness, -numbness, -tingling  Psychiatric: -anxiety, -depression, -sleep difficulty  Endocrine: +night sweats, +hot flashes           Medical / Social / Family History     Past Medical History:   Diagnosis Date    Anxiety     Depression     Polyp of colon 2024       Past Surgical History:   Procedure Laterality Date     SECTION      HERNIA REPAIR      PARTIAL HYSTERECTOMY         Social History  Ms.  reports that she has never smoked. She has been exposed to tobacco smoke. She has never used smokeless tobacco. She reports that she does not drink alcohol and does not use drugs.    Family History  Ms.'s family history includes Hypertension in her mother.    Medications and Allergies     Medications  No outpatient medications have been marked as taking for the 4/10/25 encounter (Office Visit) with Yandy Amaro FNP.       Allergies  Review of patient's allergies indicates:  No Known Allergies    Physical Examination     Vitals:    04/10/25 1526   BP: 127/87   Pulse: 65   Resp: 18   Temp: 98.1 °F (36.7 °C)   TempSrc: Oral   SpO2: 97%   Weight: 126.3 kg (278 lb 6.4 oz)   Height: 5' 11" (1.803 m)        Physical Exam    General: No acute distress. Well-developed. Well-nourished.  Eyes: " EOMI. Sclerae anicteric.  HENT: Normocephalic. Atraumatic. Nares patent. Moist oral mucosa. Enlarged turbinates.  Ears: Fluid in both ears. Bilateral EACs clear.  Cardiovascular: Regular rate. Regular rhythm. No murmurs. No rubs. No gallops. Normal S1, S2.  Respiratory: Normal respiratory effort. Clear to auscultation bilaterally. No rales. No rhonchi. No wheezing.  Abdomen: Soft. Non-tender. Non-distended. Normoactive bowel sounds.  Musculoskeletal: No  obvious deformity. Lateral joint line tenderness (Bilateral knees). Medial joint line tenderness (Bilateral knees).  Extremities: No lower extremity edema.  Neurological: Alert & oriented x3. No slurred speech. Normal gait.  Psychiatric: Normal mood. Normal affect. Good insight. Good judgment.  Skin: Warm. Dry. No rash.  Neck: Tenderness in left neck.  MSK: Elbow - Right: Tenderness in right elbow.  MSK: Elbow - Left: Tenderness in left elbow.  MSK: Foot/Ankle - Right: Mild tenderness in right ankle lateral aspect.  MSK: Foot/Ankle - Left: Tenderness in left ankle lateral aspect.       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  - Joint pain likely exacerbated by infections and menopause.  - Recommend allergy medication to address sinus symptoms and enlarged turbinates.  - Considered  hormonal changes as cause of hot flashes and joint pain.  - Explained connection between muscle strain, eye strain, and headaches.    EPISTAXIS AND CHRONIC SINUSITIS:  - Patient woke up Monday morning with blood in nose.  - Prescribed Keflex antibiotic to help with sinus problems.  - Recommend to restart Claritin for allergy management.  - Patient has been experiencing frequent nasal discharge.  - Observed enlarged turbinates and swelling from allergens in nose, with pressure in maxillary sinuses.  - Prescribed Keflex 3 times daily for sinus infection.  - Recommend restarting Claritin for allergy symptom management.  - Prescribed Keflex antibiotic to address respiratory issues.    OTITIS MEDIA:  - Observed fluid in both ears.    KNEE PAIN:  - Patient reports joint pain in knees, with the right knee being very tender.  - Prescribed Tylenol Arthritis strength, 1 tablet in the morning and 1 at night for joint pain.  - Advised to perform stretching exercises at work.  - Patient reports joint pain in knees, with the left knee being very tender.    ELBOW PAIN:  - Patient reports joint pain in elbows, with the right elbow being particularly tender on the lateral epicondyle.  - Prescribed Tylenol Arthritis strength, 1 tablet in the morning and 1 at night for joint pain.  - Patient reports joint pain in elbows, with the left elbow being mildly tender.    ANKLE PAIN:  - Observed mild tenderness on the lateral aspect of the right ankle.  - Prescribed Tylenol Arthritis strength, 1 tablet in the morning and 1 at night for joint pain.  - Advised to perform stretching exercises at work.  - Observed tenderness on the lateral aspect of the left ankle.    MENOPAUSAL SYMPTOMS:  - Patient reports experiencing menopausal symptoms for about 2 months, including hot flashes and night sweats.  - Discussed potential benefits of OTC remedies for hot flashes, including evening primrose oil and soy products.  - Informed about the importance of  mammogram before initiating hormone therapy.  - Referred to gynecologist at the [facility] in [place] for menopausal symptoms evaluation and hormone therapy consideration.    PARTIAL HYSTERECTOMY:  - Patient reports having undergone a partial hysterectomy.    VISUAL DISTURBANCE:  - Patient reports vision deterioration and wears bifocals.  - Advised to wear bifocals consistently during work to prevent eye strain and associated headaches.  - Suggested that vision issues could be contributing to headaches.    URINARY TRACT INFECTION:  - Patient reports hematuria.  - Urinalysis performed during the visit confirmed infection.  - Prescribed Keflex 3 times daily for bladder infection.  - Advised to increase fluid intake.  - Recommend consuming yogurt to prevent yeast infect  ion as a side effect of antibiotic use.    GENERAL RECOMMENDATIONS:  - Increase fluid intake, especially water.  - Consume yogurt or buttermilk to prevent yeast infections while on antibiotics.  - Perform stretching exercises at work, including shoulder rolls and neck exercises.  - Use heat and ice for joint pain relief.  - Educated on potential for antibiotics to cause yeast infections and preventive measures.             Problem List Items Addressed This Visit    None  Visit Diagnoses         Urinary tract infection with hematuria, site unspecified  (Chronic)   -  Primary      Hematuria, unspecified type          Multiple joint pain          Hot flashes          Acute maxillary sinusitis, recurrence not specified          Menopause  (Chronic)           .      Health Maintenance Due   Topic Date Due    Hepatitis C Screening  Never done    Lipid Panel  Never done    HIV Screening  Never done    Hemoglobin A1c (Diabetic Prevention Screening)  Never done    TETANUS VACCINE  04/30/2007    Shingles Vaccine (1 of 2) Never done    Pneumococcal Vaccines (Age 50+) (1 of 1 - PCV) Never done    COVID-19 Vaccine (3 - 2024-25 season) 09/01/2024     Mammogram  03/07/2025     Health Maintenance Topics with due status: Not Due       Topic Last Completion Date    Colorectal Cancer Screening 05/16/2024    RSV Vaccine (Age 60+ and Pregnant patients) Not Due       Procedures     No future appointments.     No follow-ups on file.     Signature:  JAYCEE Andrew    Date of encounter: 4/10/25      This note was generated with the assistance of ambient listening technology. Verbal consent was obtained by the patient and accompanying visitor(s) for the recording of patient appointment to facilitate this note. I attest to having reviewed and edited the generated note for accuracy, though some syntax or spelling errors may persist. Please contact the author of this note for any clarification.